# Patient Record
Sex: FEMALE
[De-identification: names, ages, dates, MRNs, and addresses within clinical notes are randomized per-mention and may not be internally consistent; named-entity substitution may affect disease eponyms.]

---

## 2024-04-01 ENCOUNTER — ASOB RESULT (OUTPATIENT)
Age: 39
End: 2024-04-01

## 2024-04-01 ENCOUNTER — APPOINTMENT (OUTPATIENT)
Dept: ANTEPARTUM | Facility: CLINIC | Age: 39
End: 2024-04-01
Payer: COMMERCIAL

## 2024-04-01 PROCEDURE — 76817 TRANSVAGINAL US OBSTETRIC: CPT

## 2024-04-02 PROBLEM — Z00.00 ENCOUNTER FOR PREVENTIVE HEALTH EXAMINATION: Status: ACTIVE | Noted: 2024-04-02

## 2024-04-19 ENCOUNTER — APPOINTMENT (OUTPATIENT)
Dept: ANTEPARTUM | Facility: CLINIC | Age: 39
End: 2024-04-19
Payer: COMMERCIAL

## 2024-04-19 ENCOUNTER — ASOB RESULT (OUTPATIENT)
Age: 39
End: 2024-04-19

## 2024-04-19 PROCEDURE — 36415 COLL VENOUS BLD VENIPUNCTURE: CPT

## 2024-04-19 PROCEDURE — 76813 OB US NUCHAL MEAS 1 GEST: CPT

## 2024-04-19 PROCEDURE — 93976 VASCULAR STUDY: CPT

## 2024-05-14 ENCOUNTER — APPOINTMENT (OUTPATIENT)
Dept: ANTEPARTUM | Facility: CLINIC | Age: 39
End: 2024-05-14
Payer: COMMERCIAL

## 2024-05-14 ENCOUNTER — ASOB RESULT (OUTPATIENT)
Age: 39
End: 2024-05-14

## 2024-05-14 PROCEDURE — 76805 OB US >/= 14 WKS SNGL FETUS: CPT

## 2024-05-14 PROCEDURE — 76817 TRANSVAGINAL US OBSTETRIC: CPT

## 2024-06-10 ENCOUNTER — APPOINTMENT (OUTPATIENT)
Dept: ANTEPARTUM | Facility: CLINIC | Age: 39
End: 2024-06-10
Payer: COMMERCIAL

## 2024-06-10 ENCOUNTER — ASOB RESULT (OUTPATIENT)
Age: 39
End: 2024-06-10

## 2024-06-10 PROCEDURE — 76817 TRANSVAGINAL US OBSTETRIC: CPT

## 2024-06-10 PROCEDURE — 76811 OB US DETAILED SNGL FETUS: CPT

## 2024-06-17 ENCOUNTER — APPOINTMENT (OUTPATIENT)
Dept: ANTEPARTUM | Facility: CLINIC | Age: 39
End: 2024-06-17

## 2024-07-29 ENCOUNTER — APPOINTMENT (OUTPATIENT)
Dept: ANTEPARTUM | Facility: CLINIC | Age: 39
End: 2024-07-29
Payer: COMMERCIAL

## 2024-07-29 ENCOUNTER — ASOB RESULT (OUTPATIENT)
Age: 39
End: 2024-07-29

## 2024-07-29 PROCEDURE — 76820 UMBILICAL ARTERY ECHO: CPT | Mod: 59

## 2024-07-29 PROCEDURE — 76819 FETAL BIOPHYS PROFIL W/O NST: CPT

## 2024-07-29 PROCEDURE — 76816 OB US FOLLOW-UP PER FETUS: CPT

## 2024-08-26 ENCOUNTER — ASOB RESULT (OUTPATIENT)
Age: 39
End: 2024-08-26

## 2024-08-26 ENCOUNTER — APPOINTMENT (OUTPATIENT)
Dept: ANTEPARTUM | Facility: CLINIC | Age: 39
End: 2024-08-26
Payer: COMMERCIAL

## 2024-08-26 PROCEDURE — 76816 OB US FOLLOW-UP PER FETUS: CPT

## 2024-08-26 PROCEDURE — 76819 FETAL BIOPHYS PROFIL W/O NST: CPT | Mod: 59

## 2024-09-12 ENCOUNTER — INPATIENT (INPATIENT)
Facility: HOSPITAL | Age: 39
LOS: 7 days | Discharge: HOME | DRG: 833 | End: 2024-09-20
Attending: OBSTETRICS & GYNECOLOGY | Admitting: OBSTETRICS & GYNECOLOGY
Payer: COMMERCIAL

## 2024-09-12 VITALS
HEART RATE: 107 BPM | RESPIRATION RATE: 6 BRPM | TEMPERATURE: 98 F | DIASTOLIC BLOOD PRESSURE: 76 MMHG | SYSTOLIC BLOOD PRESSURE: 111 MMHG

## 2024-09-12 DIAGNOSIS — O26.899 OTHER SPECIFIED PREGNANCY RELATED CONDITIONS, UNSPECIFIED TRIMESTER: ICD-10-CM

## 2024-09-12 LAB
ALBUMIN SERPL ELPH-MCNC: 3.9 G/DL — SIGNIFICANT CHANGE UP (ref 3.3–5)
ALP SERPL-CCNC: 162 U/L — HIGH (ref 40–120)
ALT FLD-CCNC: 21 U/L — SIGNIFICANT CHANGE UP (ref 10–45)
ANION GAP SERPL CALC-SCNC: 9 MMOL/L — SIGNIFICANT CHANGE UP (ref 5–17)
APTT BLD: 26 SEC — SIGNIFICANT CHANGE UP (ref 24.5–35.6)
AST SERPL-CCNC: 25 U/L — SIGNIFICANT CHANGE UP (ref 10–40)
BASOPHILS # BLD AUTO: 0.02 K/UL — SIGNIFICANT CHANGE UP (ref 0–0.2)
BASOPHILS NFR BLD AUTO: 0.1 % — SIGNIFICANT CHANGE UP (ref 0–2)
BILIRUB SERPL-MCNC: 0.3 MG/DL — SIGNIFICANT CHANGE UP (ref 0.2–1.2)
BLD GP AB SCN SERPL QL: NEGATIVE — SIGNIFICANT CHANGE UP
BUN SERPL-MCNC: 11 MG/DL — SIGNIFICANT CHANGE UP (ref 7–23)
CALCIUM SERPL-MCNC: 8.9 MG/DL — SIGNIFICANT CHANGE UP (ref 8.4–10.5)
CHLORIDE SERPL-SCNC: 103 MMOL/L — SIGNIFICANT CHANGE UP (ref 96–108)
CO2 SERPL-SCNC: 21 MMOL/L — LOW (ref 22–31)
CREAT SERPL-MCNC: 0.43 MG/DL — LOW (ref 0.5–1.3)
EGFR: 126 ML/MIN/1.73M2 — SIGNIFICANT CHANGE UP
EOSINOPHIL # BLD AUTO: 0.01 K/UL — SIGNIFICANT CHANGE UP (ref 0–0.5)
EOSINOPHIL NFR BLD AUTO: 0.1 % — SIGNIFICANT CHANGE UP (ref 0–6)
FIBRINOGEN PPP-MCNC: 445 MG/DL — SIGNIFICANT CHANGE UP (ref 200–445)
GLUCOSE SERPL-MCNC: 118 MG/DL — HIGH (ref 70–99)
HCT VFR BLD CALC: 30.7 % — LOW (ref 34.5–45)
HGB BLD-MCNC: 9.8 G/DL — LOW (ref 11.5–15.5)
IMM GRANULOCYTES NFR BLD AUTO: 0.9 % — SIGNIFICANT CHANGE UP (ref 0–0.9)
LDH SERPL L TO P-CCNC: 173 U/L — SIGNIFICANT CHANGE UP (ref 50–242)
LYMPHOCYTES # BLD AUTO: 1.06 K/UL — SIGNIFICANT CHANGE UP (ref 1–3.3)
LYMPHOCYTES # BLD AUTO: 7.6 % — LOW (ref 13–44)
MCHC RBC-ENTMCNC: 28.2 PG — SIGNIFICANT CHANGE UP (ref 27–34)
MCHC RBC-ENTMCNC: 31.9 GM/DL — LOW (ref 32–36)
MCV RBC AUTO: 88.2 FL — SIGNIFICANT CHANGE UP (ref 80–100)
MONOCYTES # BLD AUTO: 0.24 K/UL — SIGNIFICANT CHANGE UP (ref 0–0.9)
MONOCYTES NFR BLD AUTO: 1.7 % — LOW (ref 2–14)
NEUTROPHILS # BLD AUTO: 12.49 K/UL — HIGH (ref 1.8–7.4)
NEUTROPHILS NFR BLD AUTO: 89.6 % — HIGH (ref 43–77)
NRBC # BLD: 0 /100 WBCS — SIGNIFICANT CHANGE UP (ref 0–0)
PLATELET # BLD AUTO: 293 K/UL — SIGNIFICANT CHANGE UP (ref 150–400)
POTASSIUM SERPL-MCNC: 3.9 MMOL/L — SIGNIFICANT CHANGE UP (ref 3.5–5.3)
POTASSIUM SERPL-SCNC: 3.9 MMOL/L — SIGNIFICANT CHANGE UP (ref 3.5–5.3)
PROT SERPL-MCNC: 7.5 G/DL — SIGNIFICANT CHANGE UP (ref 6–8.3)
RBC # BLD: 3.48 M/UL — LOW (ref 3.8–5.2)
RBC # FLD: 13.8 % — SIGNIFICANT CHANGE UP (ref 10.3–14.5)
RH IG SCN BLD-IMP: POSITIVE — SIGNIFICANT CHANGE UP
RH IG SCN BLD-IMP: POSITIVE — SIGNIFICANT CHANGE UP
SODIUM SERPL-SCNC: 133 MMOL/L — LOW (ref 135–145)
URATE SERPL-MCNC: 3.4 MG/DL — SIGNIFICANT CHANGE UP (ref 2.5–7)
WBC # BLD: 13.95 K/UL — HIGH (ref 3.8–10.5)
WBC # FLD AUTO: 13.95 K/UL — HIGH (ref 3.8–10.5)

## 2024-09-12 PROCEDURE — 88307 TISSUE EXAM BY PATHOLOGIST: CPT | Mod: 26

## 2024-09-12 RX ORDER — AMOXICILLIN 500 MG
500 CAPSULE ORAL EVERY 8 HOURS
Refills: 0 | Status: DISCONTINUED | OUTPATIENT
Start: 2024-09-12 | End: 2024-09-13

## 2024-09-12 RX ORDER — AZITHROMYCIN 500 MG/1
1000 TABLET, FILM COATED ORAL ONCE
Refills: 0 | Status: COMPLETED | OUTPATIENT
Start: 2024-09-12 | End: 2024-09-12

## 2024-09-12 RX ORDER — BETAMETHASONE VALERATE
12 POWDER (GRAM) MISCELLANEOUS EVERY 24 HOURS
Refills: 0 | Status: COMPLETED | OUTPATIENT
Start: 2024-09-12 | End: 2024-09-13

## 2024-09-12 RX ORDER — FLU VACCINE TS 2012-2013(5YR+) 45MCG/.5ML
0.5 VIAL (ML) INTRAMUSCULAR ONCE
Refills: 0 | Status: DISCONTINUED | OUTPATIENT
Start: 2024-09-12 | End: 2024-09-16

## 2024-09-12 RX ORDER — AMPICILLIN TRIHYDRATE 500 MG
CAPSULE ORAL
Refills: 0 | Status: DISCONTINUED | OUTPATIENT
Start: 2024-09-12 | End: 2024-09-13

## 2024-09-12 RX ORDER — AMPICILLIN TRIHYDRATE 500 MG
2 CAPSULE ORAL ONCE
Refills: 0 | Status: COMPLETED | OUTPATIENT
Start: 2024-09-12 | End: 2024-09-12

## 2024-09-12 RX ORDER — AMPICILLIN TRIHYDRATE 500 MG
2 CAPSULE ORAL EVERY 6 HOURS
Refills: 0 | Status: DISCONTINUED | OUTPATIENT
Start: 2024-09-12 | End: 2024-09-13

## 2024-09-12 RX ADMIN — Medication 116 GRAM(S): at 22:13

## 2024-09-12 RX ADMIN — Medication 116 GRAM(S): at 16:08

## 2024-09-12 RX ADMIN — AZITHROMYCIN 1000 MILLIGRAM(S): 500 TABLET, FILM COATED ORAL at 17:01

## 2024-09-12 RX ADMIN — Medication 12 MILLIGRAM(S): at 13:18

## 2024-09-12 NOTE — OB RN PATIENT PROFILE - CENTRAL VENOUS CATHETER
Bed: 10  Expected date:   Expected time:   Means of arrival: [facility] [facility] [employer]  Comments:  JUS AQUINO    no

## 2024-09-12 NOTE — OB RN TRIAGE NOTE - AS PAIN REST
0 (no pain/absence of nonverbal indicators of pain)
Plan: Advised removal is cosmetic, $150/seesion
Detail Level: Zone

## 2024-09-12 NOTE — OB PROVIDER H&P - HISTORY OF PRESENT ILLNESS
Patient is   39y GP at 33w6d presenting for vaginal bleeding. Patient states she had brown discharge for 1 week, was evaluated in office when the bleeding began, was found to be closed and told to monitor. In the last 2 days she began having increased discharge with red streaking. States she does not have heavy bleeding or discharge, she uses pantyliners. Denies anything in vaginal canal, denies sexual intercourse. Denies LOF, trickling, CTX. Endorses FM.     Ante: Spontaneous pregnancy. NIPT wnl. Anatomy scan significant for echogenic bowel. Was also found to have fetal PACs s/p normal fetal echo.   EFW   GBS unknown    OBHX:  G1 - current   GynHX: denies fibroids, ovarian cysts, abnormal pap smear, STI/herpes. history of 3 cm intramural fibroid at the fundus. HPV+ pap nl in 2024.  MedHX: denies  Surghx: denies  Medications: denies  Allergies: NKDA    Physical Exam:  /76       General: NAD  Pulm: no increased WOB  Abdomen: soft, gravid, nontender  Extremities: wnl     TAUS: Breech spine anterior BENJI 12.14  VE: 1/long   Spec: gross pooling on examination, minimal blood appreciated, nitrazine x2 positive + on microscope.     EFM: 150 bpm, mod variability, + accels, - decels; reactive and reassuring  [place]: q9min            Patient is   39y  at 33w6d presenting for vaginal bleeding. Patient states she had brown discharge for 1 week, was evaluated in office when the bleeding began, was found to be closed and told to monitor. In the last 2 days she began having increased discharge with red streaking. States she does not have heavy bleeding or discharge, she uses pantyliners. Denies anything in vaginal canal, denies sexual intercourse. Denies LOF, trickling, CTX. Endorses FM.     Ante: Spontaneous pregnancy. NIPT wnl. Anatomy scan significant for echogenic bowel. Was also found to have fetal PACs s/p normal fetal echo.   EFW   GBS unknown    OBHX:  G1 - current   GynHX: denies fibroids, ovarian cysts, abnormal pap smear, STI/herpes. history of 3 cm intramural fibroid at the fundus. HPV+ pap nl in .  MedHX: denies  Surghx: denies  Medications: denies  Allergies: NKDA    Physical Exam:  /76       General: NAD  Pulm: no increased WOB  Abdomen: soft, gravid, nontender  Extremities: wnl     TAUS: Breech spine anterior BENJI 12.14  VE: 1/long   Spec: gross pooling on examination, minimal blood appreciated, nitrazine x2 positive + on microscope.     EFM: 150 bpm, mod variability, + accels, - decels; reactive and reassuring  [place]: q9min            Patient is   39y  at 33w6d presenting for vaginal bleeding. Patient states she had brown discharge for 1 week, was evaluated in office when the bleeding began, was found to be closed and told to monitor. In the last 2 days she began having increased discharge with red streaking. States she does not have heavy bleeding or discharge, she uses pantyliners. Denies anything in vaginal canal, denies sexual intercourse. Denies LOF, trickling, CTX. Endorses FM.     Ante: Spontaneous pregnancy. NIPT wnl. Anatomy scan significant for echogenic bowel. Was also found to have fetal PACs s/p normal fetal echo.   GBS unknown    OBHX:  G1 - current   GynHX: +R lateral 3x3cm fibroid. Denies STI/herpes. HPV+ pap nl in .  MedHX: denies  Surghx: denies  Medications: denies  Allergies: NKDA    Physical Exam:  /76       General: NAD  Pulm: no increased WOB  Abdomen: soft, gravid, nontender  Extremities: wnl     TAUS: Breech spine anterior BENJI 12.14  VE: 1/long   Spec: gross pooling on examination, minimal blood appreciated, nitrazine x2 positive + on microscope.     EFM: 150 bpm, mod variability, + accels, - decels; reactive and reassuring  [place]: q9min

## 2024-09-12 NOTE — OB PROVIDER H&P - ASSESSMENT
Patient is a 40 y/o  at 33w6d admitted for PPROM requiring latency abx and steroids.    PLAN  - Admit to antepartum   - Given <34w, pt will receive steroids 12 mg q24h x 2 and latency abx azithromycin PO 1gx1 and ampicillin 2g IV q6h x 48h  - Will plan for CS in setting of breech presentation after steroids x2.   - Consent signed for  section   - NPO  - IV fluids and labs ordered  - GBS unk - GBS swab collected today   - Continuous EFM       Cassy Ha, PGY 1  Discussed with Dr. Nicholson, PGY3 and Dr. Virgen, attending    Patient is a 40 y/o  at 33w6d admitted for PPROM requiring latency abx and steroids.    PLAN  - Admit to antepartum   - Given <34w, pt will receive steroids 12 mg q24h x 2 and latency abx azithromycin PO 1gx1 and ampicillin 2g IV q6h x 48h  - Will plan for CS in setting of breech presentation after steroids x2 or in case of any indication for delivery (labor, ctx, fever)  - Consent signed for  section   - NPO  - IV fluids and labs ordered  - GBS unk - GBS swab collected today   - Continuous EFM       Cassy Ha, PGY 1  Discussed with Dr. Nicholson, PGY3 and Dr. Virgen, attending

## 2024-09-12 NOTE — OB RN PATIENT PROFILE - FALL HARM RISK - UNIVERSAL INTERVENTIONS
Bed in lowest position, wheels locked, appropriate side rails in place/Call bell, personal items and telephone in reach/Instruct patient to call for assistance before getting out of bed or chair/Non-slip footwear when patient is out of bed/[place] to call system/Physically safe environment - no spills, clutter or unnecessary equipment/Purposeful Proactive Rounding/Room/bathroom lighting operational, light cord in reach

## 2024-09-12 NOTE — OB PROVIDER H&P - NSLOWPPHRISK_OBGYN_A_OB
No previous uterine incision/[place] Pregnancy/Less than or equal to 4 previous vaginal births/No known bleeding disorder/No history of postpartum hemorrhage

## 2024-09-13 PROCEDURE — 99222 1ST HOSP IP/OBS MODERATE 55: CPT

## 2024-09-13 RX ADMIN — Medication 125 MILLILITER(S): at 09:48

## 2024-09-13 RX ADMIN — Medication 116 GRAM(S): at 10:04

## 2024-09-13 RX ADMIN — Medication 116 GRAM(S): at 04:00

## 2024-09-13 RX ADMIN — Medication 12 MILLIGRAM(S): at 13:27

## 2024-09-13 NOTE — CHART NOTE - NSCHARTNOTEFT_GEN_A_CORE
NST reviewed. Baseline 135, moderate variability, +accels, no decels  TOCO: uterine irritability, no contractions  reactive and reassuring    - continue NST TID

## 2024-09-13 NOTE — CONSULT NOTE ADULT - SUBJECTIVE AND OBJECTIVE BOX
Maternal Fetal Medicine Consult     Ms. Wen is a 38 yo  at 34 0/7wga admitted to the antepartum service with PROM. She currently denies vaginal bleeding or cramping/contractions. She reports normal fetal movement. She denies fever.     Ante: Spontaneous pregnancy. NIPT wnl. Fetal PACs s/p normal fetal echo.     PMH: denies  PSH: denies  Medications: denies  NKDA    Repeat SVE deferred, grossly ruptured per my colleagues     EFM category 1    Recommendations:  The maternal and fetal status are currently reassuring. Agree with plan of care for BMZ. NICU consult pending. GBS pending. There is no current urgent/emergent indication for delivery. There is no clinical suspicion for chorioamnionitis. We discussed the risks, benefits, alternatives, and indications for delivery versus expectant management. We discussed the guidelines, as summarized:     ACOG PB #217   " Abnormal results from fetal testing, clinical intraamniotic infection, and significant abruptio placentae are clear indications for delivery. Otherwise, gestational age is a primary factor when considering delivery versus expectant management. However, the optimal gestational age for delivery is unclear and controversial. A meta-analysis of 12 randomized controlled trials, including 3,617 women, concluded there was evidence to guide clinical practice toward expectant management regarding the risks and benefits of expectant management versus delivery in the setting of  PROM. Although there was no difference in  sepsis between women who gave birth immediately compared with those managed expectantly, immediate birth had higher risks for  respiratory distress, need for ventilation,  mortality,  intensive care unit admission, and likelihood of  birth. In patients with no contraindications to continuing the pregnancy, such as abnormal results from fetal testing or intrauterine infection, expectant management likely provides benefit for the woman and . Patients with  PROM before 34 0/7 weeks of gestation should be managed expectantly if no maternal or fetal contraindications exist. At 34 0/7 weeks of gestation and before 37 0/7 weeks of gestation, delivery has traditionally been recommended for all women with ruptured membranes. However, a recent large randomized trial of 1,839 women that evaluated immediate delivery (shortly after diagnosis and preferably within 24 hours) versus expectant management in patients with PROM between 34 0/7 weeks of gestation and 36 6/7 weeks of gestation suggests benefits to expectant management. Expectant management was according to local practice at participating centers, with 73% of patients managed in a hospital setting. There was no significant difference in the primary outcome— sepsis—or in the secondary outcome of composite  morbidity. Infants in the immediate delivery group had higher rates of respiratory distress (relative risk [RR], 1.6; 95% CI, 1.1–2.3) and mechanical ventilation (RR, 1.4; 95% CI, 1.0–1.8) and spent more days in intensive care (4 days versus 2 days). However, maternal adverse outcomes, such as hemorrhage and infection, were approximately twofold higher with expectant management, although the rate of  birth was lower (RR, 1.4; 95% CI, 1.2–1.7). According to the authors, the findings suggest that if expectant management is chosen, it should include careful monitoring of symptoms and signs of maternal infection, chorioamnionitis, and antepartum hemorrhage. This monitoring may be done best in a hospital setting. An individual participant data meta-analysis of three trials showed similar results, with no difference in composite adverse  outcome or  sepsis when comparing expectant management with immediate delivery. In addition, immediate delivery resulted in higher rates of respiratory distress syndrome, intensive care admission, and  birth. Either expectant management or immediate delivery in patients with PROM between 34 0/7 weeks of gestation and 36 6/7 weeks of gestation is a reasonable option, although the balance between benefit and risk, from both maternal and  perspectives, should be carefully considered, and patients should be counseled clearly. Care should be individualized through shared decision making, and expectant management should not extend beyond 37 0/7 weeks of gestation. Latency antibiotics are not appropriate in this setting."

## 2024-09-14 LAB
BASOPHILS # BLD AUTO: 0.01 K/UL — SIGNIFICANT CHANGE UP (ref 0–0.2)
BASOPHILS NFR BLD AUTO: 0.1 % — SIGNIFICANT CHANGE UP (ref 0–2)
CULTURE RESULTS: SIGNIFICANT CHANGE UP
EOSINOPHIL # BLD AUTO: 0 K/UL — SIGNIFICANT CHANGE UP (ref 0–0.5)
EOSINOPHIL NFR BLD AUTO: 0 % — SIGNIFICANT CHANGE UP (ref 0–6)
HCT VFR BLD CALC: 26.8 % — LOW (ref 34.5–45)
HGB BLD-MCNC: 8.3 G/DL — LOW (ref 11.5–15.5)
IMM GRANULOCYTES NFR BLD AUTO: 1.3 % — HIGH (ref 0–0.9)
LYMPHOCYTES # BLD AUTO: 1.46 K/UL — SIGNIFICANT CHANGE UP (ref 1–3.3)
LYMPHOCYTES # BLD AUTO: 12.4 % — LOW (ref 13–44)
MCHC RBC-ENTMCNC: 27.1 PG — SIGNIFICANT CHANGE UP (ref 27–34)
MCHC RBC-ENTMCNC: 31 GM/DL — LOW (ref 32–36)
MCV RBC AUTO: 87.6 FL — SIGNIFICANT CHANGE UP (ref 80–100)
MONOCYTES # BLD AUTO: 0.75 K/UL — SIGNIFICANT CHANGE UP (ref 0–0.9)
MONOCYTES NFR BLD AUTO: 6.4 % — SIGNIFICANT CHANGE UP (ref 2–14)
NEUTROPHILS # BLD AUTO: 9.36 K/UL — HIGH (ref 1.8–7.4)
NEUTROPHILS NFR BLD AUTO: 79.8 % — HIGH (ref 43–77)
NRBC # BLD: 0 /100 WBCS — SIGNIFICANT CHANGE UP (ref 0–0)
PLATELET # BLD AUTO: 290 K/UL — SIGNIFICANT CHANGE UP (ref 150–400)
RBC # BLD: 3.06 M/UL — LOW (ref 3.8–5.2)
RBC # FLD: 13.8 % — SIGNIFICANT CHANGE UP (ref 10.3–14.5)
SPECIMEN SOURCE: SIGNIFICANT CHANGE UP
WBC # BLD: 11.73 K/UL — HIGH (ref 3.8–10.5)
WBC # FLD AUTO: 11.73 K/UL — HIGH (ref 3.8–10.5)

## 2024-09-14 RX ORDER — SODIUM FERRIC GLUCONATE COMPLEX 12.5 MG/ML
125 INJECTION INTRAVENOUS
Refills: 0 | Status: COMPLETED | OUTPATIENT
Start: 2024-09-14 | End: 2024-09-15

## 2024-09-14 RX ORDER — VITAMIN A, ASCORBIC ACID, VITAMIN D, .ALPHA.-TOCOPHEROL, THIAMINE MONONITRATE, RIBOFLAVIN, NIACIN, PYRIDOXINE HYDROCHLORIDE, FOLIC ACID, CYANOCOBALAMIN, CALCIUM, IRON, MAGNESIUM, ZINC, AND COPPER 2700; 70; 400; 30; 1.6; 1.8; 18; 2.5; 1; 12; 100; 65; 25; 25; 2 [IU]/1; MG/1; [IU]/1; [IU]/1; MG/1; MG/1; MG/1; MG/1; MG/1; UG/1; MG/1; MG/1; MG/1; MG/1; MG/1
1 TABLET ORAL DAILY
Refills: 0 | Status: DISCONTINUED | OUTPATIENT
Start: 2024-09-14 | End: 2024-09-16

## 2024-09-14 RX ADMIN — SODIUM FERRIC GLUCONATE COMPLEX 110 MILLIGRAM(S): 12.5 INJECTION INTRAVENOUS at 17:10

## 2024-09-14 RX ADMIN — SODIUM FERRIC GLUCONATE COMPLEX 110 MILLIGRAM(S): 12.5 INJECTION INTRAVENOUS at 13:30

## 2024-09-14 RX ADMIN — VITAMIN A, ASCORBIC ACID, VITAMIN D, .ALPHA.-TOCOPHEROL, THIAMINE MONONITRATE, RIBOFLAVIN, NIACIN, PYRIDOXINE HYDROCHLORIDE, FOLIC ACID, CYANOCOBALAMIN, CALCIUM, IRON, MAGNESIUM, ZINC, AND COPPER 1 TABLET(S): 2700; 70; 400; 30; 1.6; 1.8; 18; 2.5; 1; 12; 100; 65; 25; 25; 2 TABLET ORAL at 12:52

## 2024-09-14 NOTE — CHART NOTE - NSCHARTNOTEFT_GEN_A_CORE
BPP performed for reports of DFM overnight.  Pt reports fluid is continuing to leak however at less volume.    BPP 6/8, BENJI 3 cm. Patient is now feeling improved fetal movement, states baby is moving more like normal in quality and quantity today. Educated pt that BPP of 6/8 is due to oligohydramnios of 3 cm, likely in setting of PPROM. Patient endorses understanding. Fetus is still breech at this time, spine to maternal anterior left.    Cassy Ha PGY1  d/w Lyn PGY3

## 2024-09-15 ENCOUNTER — TRANSCRIPTION ENCOUNTER (OUTPATIENT)
Age: 39
End: 2024-09-15

## 2024-09-15 LAB
BASOPHILS # BLD AUTO: 0 K/UL — SIGNIFICANT CHANGE UP (ref 0–0.2)
BASOPHILS NFR BLD AUTO: 0 % — SIGNIFICANT CHANGE UP (ref 0–2)
BLD GP AB SCN SERPL QL: NEGATIVE — SIGNIFICANT CHANGE UP
EOSINOPHIL # BLD AUTO: 0 K/UL — SIGNIFICANT CHANGE UP (ref 0–0.5)
EOSINOPHIL NFR BLD AUTO: 0 % — SIGNIFICANT CHANGE UP (ref 0–6)
FERRITIN SERPL-MCNC: 48 NG/ML — SIGNIFICANT CHANGE UP (ref 15–150)
HCT VFR BLD CALC: 27.9 % — LOW (ref 34.5–45)
HGB BLD-MCNC: 8.6 G/DL — LOW (ref 11.5–15.5)
IRON SATN MFR SERPL: 539 UG/DL — HIGH (ref 30–160)
IRON SATN MFR SERPL: SIGNIFICANT CHANGE UP % (ref 14–50)
LYMPHOCYTES # BLD AUTO: 1.36 K/UL — SIGNIFICANT CHANGE UP (ref 1–3.3)
LYMPHOCYTES # BLD AUTO: 11.7 % — LOW (ref 13–44)
MCHC RBC-ENTMCNC: 27.1 PG — SIGNIFICANT CHANGE UP (ref 27–34)
MCHC RBC-ENTMCNC: 30.8 GM/DL — LOW (ref 32–36)
MCV RBC AUTO: 88 FL — SIGNIFICANT CHANGE UP (ref 80–100)
MONOCYTES # BLD AUTO: 1.67 K/UL — HIGH (ref 0–0.9)
MONOCYTES NFR BLD AUTO: 14.4 % — HIGH (ref 2–14)
NEUTROPHILS # BLD AUTO: 8.57 K/UL — HIGH (ref 1.8–7.4)
NEUTROPHILS NFR BLD AUTO: 73 % — SIGNIFICANT CHANGE UP (ref 43–77)
NRBC # BLD: SIGNIFICANT CHANGE UP /100 WBCS (ref 0–0)
PLATELET # BLD AUTO: 329 K/UL — SIGNIFICANT CHANGE UP (ref 150–400)
RBC # BLD: 3.17 M/UL — LOW (ref 3.8–5.2)
RBC # FLD: 13.7 % — SIGNIFICANT CHANGE UP (ref 10.3–14.5)
RH IG SCN BLD-IMP: POSITIVE — SIGNIFICANT CHANGE UP
TIBC SERPL-MCNC: SIGNIFICANT CHANGE UP UG/DL (ref 220–430)
TRANSFERRIN SERPL-MCNC: 464 MG/DL — HIGH (ref 200–360)
UIBC SERPL-MCNC: <16.8 UG/DL — SIGNIFICANT CHANGE UP (ref 110–370)
WBC # BLD: 11.6 K/UL — HIGH (ref 3.8–10.5)
WBC # FLD AUTO: 11.6 K/UL — HIGH (ref 3.8–10.5)

## 2024-09-15 RX ADMIN — SODIUM FERRIC GLUCONATE COMPLEX 110 MILLIGRAM(S): 12.5 INJECTION INTRAVENOUS at 12:07

## 2024-09-15 RX ADMIN — VITAMIN A, ASCORBIC ACID, VITAMIN D, .ALPHA.-TOCOPHEROL, THIAMINE MONONITRATE, RIBOFLAVIN, NIACIN, PYRIDOXINE HYDROCHLORIDE, FOLIC ACID, CYANOCOBALAMIN, CALCIUM, IRON, MAGNESIUM, ZINC, AND COPPER 1 TABLET(S): 2700; 70; 400; 30; 1.6; 1.8; 18; 2.5; 1; 12; 100; 65; 25; 25; 2 TABLET ORAL at 12:07

## 2024-09-15 NOTE — CHART NOTE - NSCHARTNOTEFT_GEN_A_CORE
Note delayed due to patient care    EFM reviewed  baseline 125/mod lorne/+accel/no decel    Overall reassuring tracing, no decels  Plan to continue NST TID    Lyn PGY3

## 2024-09-16 LAB
BASOPHILS # BLD AUTO: 0.02 K/UL — SIGNIFICANT CHANGE UP (ref 0–0.2)
BASOPHILS NFR BLD AUTO: 0.2 % — SIGNIFICANT CHANGE UP (ref 0–2)
EOSINOPHIL # BLD AUTO: 0.03 K/UL — SIGNIFICANT CHANGE UP (ref 0–0.5)
EOSINOPHIL NFR BLD AUTO: 0.3 % — SIGNIFICANT CHANGE UP (ref 0–6)
HCT VFR BLD CALC: 28 % — LOW (ref 34.5–45)
HGB BLD-MCNC: 8.5 G/DL — LOW (ref 11.5–15.5)
IMM GRANULOCYTES NFR BLD AUTO: 4.3 % — HIGH (ref 0–0.9)
LYMPHOCYTES # BLD AUTO: 18.9 % — SIGNIFICANT CHANGE UP (ref 13–44)
LYMPHOCYTES # BLD AUTO: 2.1 K/UL — SIGNIFICANT CHANGE UP (ref 1–3.3)
MCHC RBC-ENTMCNC: 28.1 PG — SIGNIFICANT CHANGE UP (ref 27–34)
MCHC RBC-ENTMCNC: 30.4 GM/DL — LOW (ref 32–36)
MCV RBC AUTO: 92.7 FL — SIGNIFICANT CHANGE UP (ref 80–100)
MONOCYTES # BLD AUTO: 1.13 K/UL — HIGH (ref 0–0.9)
MONOCYTES NFR BLD AUTO: 10.1 % — SIGNIFICANT CHANGE UP (ref 2–14)
NEUTROPHILS # BLD AUTO: 7.38 K/UL — SIGNIFICANT CHANGE UP (ref 1.8–7.4)
NEUTROPHILS NFR BLD AUTO: 66.2 % — SIGNIFICANT CHANGE UP (ref 43–77)
NRBC # BLD: 0 /100 WBCS — SIGNIFICANT CHANGE UP (ref 0–0)
PLATELET # BLD AUTO: 289 K/UL — SIGNIFICANT CHANGE UP (ref 150–400)
RBC # BLD: 3.02 M/UL — LOW (ref 3.8–5.2)
RBC # FLD: 14 % — SIGNIFICANT CHANGE UP (ref 10.3–14.5)
WBC # BLD: 11.14 K/UL — HIGH (ref 3.8–10.5)
WBC # FLD AUTO: 11.14 K/UL — HIGH (ref 3.8–10.5)

## 2024-09-16 RX ORDER — DEXAMETHASONE 0.75 MG
4 TABLET ORAL EVERY 6 HOURS
Refills: 0 | Status: DISCONTINUED | OUTPATIENT
Start: 2024-09-16 | End: 2024-09-16

## 2024-09-16 RX ORDER — SODIUM CITRATE AND CITRIC ACID MONOHYDRATE 334; 500 MG/5ML; MG/5ML
30 SOLUTION ORAL ONCE
Refills: 0 | Status: COMPLETED | OUTPATIENT
Start: 2024-09-16 | End: 2024-09-16

## 2024-09-16 RX ORDER — ONDANSETRON 2 MG/ML
4 INJECTION, SOLUTION INTRAMUSCULAR; INTRAVENOUS EVERY 6 HOURS
Refills: 0 | Status: DISCONTINUED | OUTPATIENT
Start: 2024-09-16 | End: 2024-09-16

## 2024-09-16 RX ORDER — DIPHENHYDRAMINE HCL 50 MG
25 CAPSULE ORAL EVERY 6 HOURS
Refills: 0 | Status: DISCONTINUED | OUTPATIENT
Start: 2024-09-16 | End: 2024-09-20

## 2024-09-16 RX ORDER — LANOLIN
1 OINTMENT (GRAM) TOPICAL EVERY 6 HOURS
Refills: 0 | Status: DISCONTINUED | OUTPATIENT
Start: 2024-09-16 | End: 2024-09-20

## 2024-09-16 RX ORDER — NALOXONE HCL 1 MG/ML
0.1 VIAL (ML) INJECTION
Refills: 0 | Status: DISCONTINUED | OUTPATIENT
Start: 2024-09-16 | End: 2024-09-16

## 2024-09-16 RX ORDER — ENOXAPARIN SODIUM 100 MG/ML
40 INJECTION SUBCUTANEOUS EVERY 24 HOURS
Refills: 0 | Status: DISCONTINUED | OUTPATIENT
Start: 2024-09-17 | End: 2024-09-20

## 2024-09-16 RX ORDER — OXYCODONE HYDROCHLORIDE 5 MG/1
5 TABLET ORAL
Refills: 0 | Status: DISCONTINUED | OUTPATIENT
Start: 2024-09-16 | End: 2024-09-20

## 2024-09-16 RX ORDER — ONDANSETRON 2 MG/ML
8 INJECTION, SOLUTION INTRAMUSCULAR; INTRAVENOUS EVERY 6 HOURS
Refills: 0 | Status: DISCONTINUED | OUTPATIENT
Start: 2024-09-16 | End: 2024-09-20

## 2024-09-16 RX ORDER — AZITHROMYCIN 500 MG/1
500 TABLET, FILM COATED ORAL ONCE
Refills: 0 | Status: DISCONTINUED | OUTPATIENT
Start: 2024-09-16 | End: 2024-09-16

## 2024-09-16 RX ORDER — TETANUS TOXOID, REDUCED DIPHTHERIA TOXOID AND ACELLULAR PERTUSSIS VACCINE, ADSORBED 5; 2.5; 8; 8; 2.5 [IU]/.5ML; [IU]/.5ML; UG/.5ML; UG/.5ML; UG/.5ML
0.5 SUSPENSION INTRAMUSCULAR ONCE
Refills: 0 | Status: COMPLETED | OUTPATIENT
Start: 2024-09-16

## 2024-09-16 RX ORDER — IBUPROFEN 600 MG
600 TABLET ORAL EVERY 6 HOURS
Refills: 0 | Status: COMPLETED | OUTPATIENT
Start: 2024-09-16 | End: 2025-08-15

## 2024-09-16 RX ORDER — CHLORHEXIDINE GLUCONATE 40 MG/ML
1 SOLUTION TOPICAL DAILY
Refills: 0 | Status: DISCONTINUED | OUTPATIENT
Start: 2024-09-16 | End: 2024-09-16

## 2024-09-16 RX ORDER — CEFAZOLIN SODIUM 2 G/100ML
2000 INJECTION, SOLUTION INTRAVENOUS ONCE
Refills: 0 | Status: COMPLETED | OUTPATIENT
Start: 2024-09-16 | End: 2024-09-16

## 2024-09-16 RX ORDER — OXYTOCIN 10 UNIT/ML
167 AMPUL (ML) INJECTION
Qty: 30 | Refills: 0 | Status: DISCONTINUED | OUTPATIENT
Start: 2024-09-16 | End: 2024-09-20

## 2024-09-16 RX ORDER — FAMOTIDINE 10 MG/ML
20 INJECTION INTRAVENOUS ONCE
Refills: 0 | Status: COMPLETED | OUTPATIENT
Start: 2024-09-16 | End: 2024-09-16

## 2024-09-16 RX ORDER — ACETAMINOPHEN 325 MG/1
1000 TABLET ORAL ONCE
Refills: 0 | Status: COMPLETED | OUTPATIENT
Start: 2024-09-16 | End: 2024-09-16

## 2024-09-16 RX ORDER — KETOROLAC TROMETHAMINE 30 MG/ML
30 INJECTION, SOLUTION INTRAMUSCULAR EVERY 6 HOURS
Refills: 0 | Status: DISCONTINUED | OUTPATIENT
Start: 2024-09-16 | End: 2024-09-17

## 2024-09-16 RX ORDER — ACETAMINOPHEN 325 MG/1
975 TABLET ORAL
Refills: 0 | Status: DISCONTINUED | OUTPATIENT
Start: 2024-09-16 | End: 2024-09-20

## 2024-09-16 RX ORDER — OXYCODONE HYDROCHLORIDE 5 MG/1
5 TABLET ORAL ONCE
Refills: 0 | Status: DISCONTINUED | OUTPATIENT
Start: 2024-09-16 | End: 2024-09-20

## 2024-09-16 RX ADMIN — FAMOTIDINE 20 MILLIGRAM(S): 10 INJECTION INTRAVENOUS at 14:27

## 2024-09-16 RX ADMIN — ACETAMINOPHEN 400 MILLIGRAM(S): 325 TABLET ORAL at 16:15

## 2024-09-16 RX ADMIN — KETOROLAC TROMETHAMINE 30 MILLIGRAM(S): 30 INJECTION, SOLUTION INTRAMUSCULAR at 23:46

## 2024-09-16 RX ADMIN — CEFAZOLIN SODIUM 100 MILLIGRAM(S): 2 INJECTION, SOLUTION INTRAVENOUS at 14:36

## 2024-09-16 RX ADMIN — CHLORHEXIDINE GLUCONATE 1 APPLICATION(S): 40 SOLUTION TOPICAL at 14:36

## 2024-09-16 RX ADMIN — SODIUM CITRATE AND CITRIC ACID MONOHYDRATE 30 MILLILITER(S): 334; 500 SOLUTION ORAL at 14:36

## 2024-09-16 RX ADMIN — ACETAMINOPHEN 975 MILLIGRAM(S): 325 TABLET ORAL at 20:51

## 2024-09-16 RX ADMIN — Medication 125 MILLILITER(S): at 09:10

## 2024-09-16 RX ADMIN — KETOROLAC TROMETHAMINE 30 MILLIGRAM(S): 30 INJECTION, SOLUTION INTRAMUSCULAR at 16:59

## 2024-09-16 NOTE — OB RN INTRAOPERATIVE NOTE - NSSELHIDDEN_OBGYN_ALL_OB_FT
[NS_DeliveryAttending1_OBGYN_ALL_OB_FT:[id number]],[NS_DeliveryAssist1_OBGYN_ALL_OB_FT:[id number]],[NS_DeliveryRN_OBGYN_ALL_OB_FT:[id number]=]

## 2024-09-16 NOTE — CHART NOTE - NSCHARTNOTEFT_GEN_A_CORE
Pt seen at bedside. She reports contractions are becoming closer together, formerly feeling them [id number], now q10min and they are more painful. She also feels the bleeding is persisting and worsening. She endorses fetal movements.     FHT: baseline 135, moderate variability, +accels, no decels  TOCO: ctx 2 in 10min  cat I / reactive and reassuring    TAUS: breech presentation confirmed     after d/w patient, patient's partner and OB attending Dr. Jurado decision made to proceed with delivery via primary CS. All questions answered.    - ancef 2g, azithromycin 500mg  - NPO, IVF   - continuous FHT, toco  - proceed with C/S     Andrew Keller PGY2

## 2024-09-16 NOTE — OB PROVIDER DELIVERY SUMMARY - NSPROVIDERDELIVERYNOTE_OBGYN_ALL_OB_FT
Pt 40yo  at 34w4d presents for primary LTCS for breech presentation after p/w PPROM on  s/p BMZ x2.   delivered in breech presentation  Small ~1cm right extension with bandage scissors to facilitate delivery of fetal head   Uterus closed in one layer with 0 chromic  Muscle closed with 0 chromic  Fascia closed with 0 vicryl  Subcutaneous closed with 2-0 plain gut   Skin closed with ensorb  Excellent hemostasis  No complications  , IVF 1L,   Please see dictation

## 2024-09-16 NOTE — OB PROVIDER DELIVERY SUMMARY - NSSELHIDDEN_OBGYN_ALL_OB_FT
[NS_DeliveryAttending1_OBGYN_ALL_OB_FT:[id number]],[NS_DeliveryAssist1_OBGYN_ALL_OB_FT:[id number]=],[NS_DeliveryRN_OBGYN_ALL_OB_FT:[id number]=]

## 2024-09-16 NOTE — OB NEONATOLOGY/PEDIATRICIAN DELIVERY SUMMARY - NSPEDSNEONOTESA_OBGYN_ALL_OB_FT
34 weeks gestation male born via C/S due to breech presentation and  labor.  Born with weak cry and decreased tone so no delayed cord clamping done.  Baby handed to  Team, warmed, dried and stimulated.  Oropharynx suctioned.  Baby with vigorous cry.  Began having grunting and retractions, CPAP +5 given up to 70%, weaned down to 40% by 10 minutes of life.  Transferred to NICU.

## 2024-09-16 NOTE — OB RN INTRAOPERATIVE NOTE - NS_SCRUBTECH_OBGYN_ALL_OB_FT
Wound on R leg dressed with abx ointment and secured with gauze, kerlix, and ace wrap. Pt tolerated well.      Pauline Garcia  09/18/17 1907     Timoteo Timoteo Mayo

## 2024-09-16 NOTE — OB RN DELIVERY SUMMARY - NS_SEPSISRSKCALC_OBGYN_ALL_OB_FT
EOS calculated successfully. EOS Risk Factor: 0.5/1000 live births ([facility] national incidence); GA=34w3d; Temp=98.8; ROM=0.033; GBS='Unknown'; Antibiotics='No antibiotics or any antibiotics < 2 hrs prior to birth'   EOS calculated successfully. EOS Risk Factor: 0.5/1000 live births ([facility] national incidence); GA=34w3d; Temp=98.8; QGH=882.433; GBS='Unknown'; Antibiotics='No antibiotics or any antibiotics < 2 hrs prior to birth'

## 2024-09-16 NOTE — OB RN DELIVERY SUMMARY - NSSELHIDDEN_OBGYN_ALL_OB_FT
[NS_DeliveryAttending1_OBGYN_ALL_OB_FT:[id number]==],[NS_DeliveryAssist1_OBGYN_ALL_OB_FT:[id number]],[NS_DeliveryRN_OBGYN_ALL_OB_FT:[id number]=]

## 2024-09-17 LAB
ANISOCYTOSIS BLD QL: SLIGHT — SIGNIFICANT CHANGE UP
BASOPHILS # BLD AUTO: 0 K/UL — SIGNIFICANT CHANGE UP (ref 0–0.2)
BASOPHILS NFR BLD AUTO: 0 % — SIGNIFICANT CHANGE UP (ref 0–2)
EOSINOPHIL # BLD AUTO: 0 K/UL — SIGNIFICANT CHANGE UP (ref 0–0.5)
EOSINOPHIL NFR BLD AUTO: 0 % — SIGNIFICANT CHANGE UP (ref 0–6)
GIANT PLATELETS BLD QL SMEAR: PRESENT — SIGNIFICANT CHANGE UP
HCT VFR BLD CALC: 26.4 % — LOW (ref 34.5–45)
HGB BLD-MCNC: 8 G/DL — LOW (ref 11.5–15.5)
HYPOCHROMIA BLD QL: SLIGHT — SIGNIFICANT CHANGE UP
LYMPHOCYTES # BLD AUTO: 13 % — SIGNIFICANT CHANGE UP (ref 13–44)
LYMPHOCYTES # BLD AUTO: 2.61 K/UL — SIGNIFICANT CHANGE UP (ref 1–3.3)
MANUAL SMEAR VERIFICATION: SIGNIFICANT CHANGE UP
MCHC RBC-ENTMCNC: 26.8 PG — LOW (ref 27–34)
MCHC RBC-ENTMCNC: 30.3 GM/DL — LOW (ref 32–36)
MCV RBC AUTO: 88.3 FL — SIGNIFICANT CHANGE UP (ref 80–100)
MONOCYTES # BLD AUTO: 0.52 K/UL — SIGNIFICANT CHANGE UP (ref 0–0.9)
MONOCYTES NFR BLD AUTO: 2.6 % — SIGNIFICANT CHANGE UP (ref 2–14)
NEUTROPHILS # BLD AUTO: 16.97 K/UL — HIGH (ref 1.8–7.4)
NEUTROPHILS NFR BLD AUTO: 84.4 % — HIGH (ref 43–77)
OVALOCYTES BLD QL SMEAR: SLIGHT — SIGNIFICANT CHANGE UP
PLAT MORPH BLD: ABNORMAL
PLATELET # BLD AUTO: 296 K/UL — SIGNIFICANT CHANGE UP (ref 150–400)
POLYCHROMASIA BLD QL SMEAR: SLIGHT — SIGNIFICANT CHANGE UP
RBC # BLD: 2.99 M/UL — LOW (ref 3.8–5.2)
RBC # FLD: 13.9 % — SIGNIFICANT CHANGE UP (ref 10.3–14.5)
RBC BLD AUTO: ABNORMAL
WBC # BLD: 20.11 K/UL — HIGH (ref 3.8–10.5)
WBC # FLD AUTO: 20.11 K/UL — HIGH (ref 3.8–10.5)

## 2024-09-17 RX ORDER — IBUPROFEN 600 MG
600 TABLET ORAL EVERY 6 HOURS
Refills: 0 | Status: DISCONTINUED | OUTPATIENT
Start: 2024-09-17 | End: 2024-09-20

## 2024-09-17 RX ADMIN — KETOROLAC TROMETHAMINE 30 MILLIGRAM(S): 30 INJECTION, SOLUTION INTRAMUSCULAR at 06:19

## 2024-09-17 RX ADMIN — ACETAMINOPHEN 975 MILLIGRAM(S): 325 TABLET ORAL at 22:00

## 2024-09-17 RX ADMIN — ACETAMINOPHEN 975 MILLIGRAM(S): 325 TABLET ORAL at 20:42

## 2024-09-17 RX ADMIN — KETOROLAC TROMETHAMINE 30 MILLIGRAM(S): 30 INJECTION, SOLUTION INTRAMUSCULAR at 11:31

## 2024-09-17 RX ADMIN — ENOXAPARIN SODIUM 40 MILLIGRAM(S): 100 INJECTION SUBCUTANEOUS at 06:19

## 2024-09-17 RX ADMIN — ACETAMINOPHEN 975 MILLIGRAM(S): 325 TABLET ORAL at 15:31

## 2024-09-17 RX ADMIN — ACETAMINOPHEN 975 MILLIGRAM(S): 325 TABLET ORAL at 10:03

## 2024-09-17 RX ADMIN — Medication 600 MILLIGRAM(S): at 18:03

## 2024-09-17 NOTE — LACTATION INITIAL EVALUATION - NS LACT CON REASON FOR REQ
LC to bedside of Marlo Wen, a 38 yo parent to a 34 weeker infant, admitted to the NICU for management of prematurity. Marlo states that she plans to breastfeed. On exam, breasts are round, dense, symmetric. Nipples intact and everted, +colostrum. LC reviewed hand expression/collection of colostrum with good patient demonstration. We discussed introducing the pump x 15 min Q3h ~24h of life, followed by 10 minutes of hand expression. LC reviewed the keys to an adequate milk supply and the need for breast stimulation Q3h. NICU policy for labeling and transportation of breast milk, as well as cleaning of pump parts was reviewed. Encouraged Marlo to visit her baby and hold her baby skin to skin when both she and the baby and feeling stable. Assured her of ongoing support from lactation team throughout she and her baby's hospitalization. Plan: Begin pumping Q3h x 15 minutes followed by 10 minutes of hand expression, direct breastfeeding once baby is medically cleared. All questions answered at this time and patient verbalizes her understanding. LC available via RN.

## 2024-09-18 RX ORDER — KETOROLAC TROMETHAMINE 30 MG/ML
30 INJECTION, SOLUTION INTRAMUSCULAR ONCE
Refills: 0 | Status: DISCONTINUED | OUTPATIENT
Start: 2024-09-18 | End: 2024-09-18

## 2024-09-18 RX ADMIN — Medication 600 MILLIGRAM(S): at 17:54

## 2024-09-18 RX ADMIN — KETOROLAC TROMETHAMINE 30 MILLIGRAM(S): 30 INJECTION, SOLUTION INTRAMUSCULAR at 06:25

## 2024-09-18 RX ADMIN — ACETAMINOPHEN 975 MILLIGRAM(S): 325 TABLET ORAL at 20:40

## 2024-09-18 RX ADMIN — ACETAMINOPHEN 975 MILLIGRAM(S): 325 TABLET ORAL at 22:00

## 2024-09-18 RX ADMIN — ACETAMINOPHEN 975 MILLIGRAM(S): 325 TABLET ORAL at 09:14

## 2024-09-18 RX ADMIN — ENOXAPARIN SODIUM 40 MILLIGRAM(S): 100 INJECTION SUBCUTANEOUS at 06:25

## 2024-09-18 RX ADMIN — Medication 600 MILLIGRAM(S): at 12:18

## 2024-09-18 RX ADMIN — ACETAMINOPHEN 975 MILLIGRAM(S): 325 TABLET ORAL at 15:00

## 2024-09-18 RX ADMIN — Medication 600 MILLIGRAM(S): at 00:30

## 2024-09-18 RX ADMIN — ACETAMINOPHEN 975 MILLIGRAM(S): 325 TABLET ORAL at 03:31

## 2024-09-18 NOTE — DIETITIAN INITIAL EVALUATION ADULT - PERSON TAUGHT/METHOD
importance of adequate protein and calories, food sources of protein, adequate fluids, continuing with prenatal MVI, importance of adequate iron, food sources of iron/verbal instruction/written material/patient instructed

## 2024-09-18 NOTE — DIETITIAN INITIAL EVALUATION ADULT - OTHER INFO
Patient seen at bedside for length of stay assessment. Patient status post  on  @34w3d. Current diet order: regular. Reports good appetite. Experienced some nausea during first trimester of pregnancy. Infant is in the NICU, mom is hand expressing/pumping. Provided nutrition education discussing importance of adequate protein and calories, food sources of protein, adequate fluids, continuing with prenatal MVI, importance of adequate iron, food sources of iron. Provided handout "Breastfeeding Nutrition Therapy." Patient appreciative, verbalized understanding. Pre-pregnancy weight: 122 pounds. Dosing weight: 150 pounds. No edema documented. Denies nausea/vomiting/diarrhea - small BM today but otherwise no BM since , reports bowel sounds. Nutritionally pertinent labs wnl. Meds: zofran, simethicone.

## 2024-09-18 NOTE — DIETITIAN INITIAL EVALUATION ADULT - PERTINENT MEDS FT
MEDICATIONS  (STANDING):  acetaminophen     Tablet .. 975 milliGRAM(s) Oral <User Schedule>  diphtheria/tetanus/pertussis (acellular) Vaccine (Adacel) 0.5 milliLiter(s) IntraMuscular once  enoxaparin Injectable 40 milliGRAM(s) SubCutaneous every 24 hours  ibuprofen  Tablet. 600 milliGRAM(s) Oral every 6 hours  lactated ringers. 1000 milliLiter(s) (125 mL/Hr) IV Continuous <Continuous>  oxytocin Infusion 167 milliUNIT(s)/Min (167 mL/Hr) IV Continuous <Continuous>    MEDICATIONS  (PRN):  diphenhydrAMINE 25 milliGRAM(s) Oral every 6 hours PRN Pruritus  lanolin Ointment 1 Application(s) Topical every 6 hours PRN Sore Nipples  magnesium hydroxide Suspension 30 milliLiter(s) Oral two times a day PRN Constipation  ondansetron Injectable 8 milliGRAM(s) IV Push every 6 hours PRN Nausea and/or Vomiting  oxyCODONE    IR 5 milliGRAM(s) Oral every 3 hours PRN Moderate to Severe Pain (4-10)  oxyCODONE    IR 5 milliGRAM(s) Oral once PRN Moderate to Severe Pain (4-10)  simethicone 80 milliGRAM(s) Chew every 4 hours PRN Gas

## 2024-09-18 NOTE — DIETITIAN INITIAL EVALUATION ADULT - OTHER CALCULATIONS
Pre-pregnancy weight used to estimate needs (122 pounds). Needs adjusted for breastfeeding, post-op healing.  Estimated energy needs: 8131-4032 kcal (25-30kcal/kg +500)  Estimated protein needs: 71g protein  Estimated fluid needs: 1943-2220mL (35-40mL/kg)

## 2024-09-18 NOTE — DIETITIAN INITIAL EVALUATION ADULT - NUTRITION DIAGNOSIS
Patient: Henrique Cali Date: 10/2/2017   : 6/3/1932 Attending: Sara Soto MD   85 year old male                      Subjective: Feels unsteady with walking, reports history of recent falls     Reviewed: Allergies, Medical History, Surgical History, Social History, Family History and Medications    Vital Last Value 24 Hour Range   Temperature 96.8 °F (36 °C) (10/02/17 1300) Temp  Min: 96.6 °F (35.9 °C)  Max: 97.3 °F (36.3 °C)   Pulse (!) 42 (10/02/17 1530) Pulse  Min: 40  Max: 84   Respiratory 18 (10/02/17 1300) Resp  Min: 16  Max: 18   Non-Invasive  Blood Pressure 151/45 (10/02/17 1530) BP  Min: 108/58  Max: 186/74   Pulse Oximetry 98 % (10/02/17 1300) SpO2  Min: 97 %  Max: 98 %     Vital Today Admit   Weight 74.6 kg (10/01/17 1258) Weight: 74.6 kg (10/01/17 1258)   Height N/A Height: 5' 5\" (165.1 cm) (10/01/17 1258)   BMI N/A BMI (Calculated): 27.43 (10/01/17 1258)     Weight over the past 48 Hours:  Patient Vitals for the past 48 hrs:   Weight   10/01/17 1258 74.6 kg        Intake/Output:    Last Stool Occurrence: 1 (10/02/17 0900)    No intake/output data recorded.    I/O last 3 completed shifts:  In: 540 [P.O.:540]  Out: 580 [Urine:580]      Intake/Output Summary (Last 24 hours) at 10/02/17 1544  Last data filed at 10/02/17 1300   Gross per 24 hour   Intake              540 ml   Output              580 ml   Net              -40 ml       Central Line: No    Guajardo: No    Physical Exam:   Visit Vitals  /45   Pulse (!) 42   Temp 96.8 °F (36 °C) (Oral)   Resp 18   Ht 5' 5\" (1.651 m)   Wt 74.6 kg   SpO2 98%   BMI 27.37 kg/m²     General appearance: alert and appears stated age  Head: Normocephalic, without obvious abnormality, atraumatic  Eyes: conjunctivae/sclerae normal. No erythema, edema or exudate.  Ears: External ears are normal  Nose: Nares normal. Septum midline. Mucosa normal. No drainage or sinus tenderness.  Neck: no adenopathy, no carotid bruit, no JVD, supple, symmetrical, trachea midline and  thyroid not enlarged, symmetric, no tenderness/mass/nodules  Lungs: clear to auscultation bilaterally  Heart: regular rate and rhythm, S1, S2 normal, no murmur, click, rub or gallop  Abdomen: Soft, non-tender; bowel sounds normal; no masses, no organomegaly  Extremities: Mild lower extremity edema  Neurologic: Gait: Abnormal and Early knee recurvatum on the left, marked flexion at the waist  Step to gait pattern    Laboratory Results:    Lab Results   Component Value Date    SODIUM 131 (L) 10/02/2017    POTASSIUM 5.1 10/02/2017    CHLORIDE 97 (L) 10/02/2017    CO2 25 10/02/2017    CALCIUM 7.6 (L) 10/02/2017    BUN 62 (H) 10/02/2017    CREATININE 4.40 (H) 10/02/2017    MG 1.6 (L) 10/02/2017    INR 1.1 10/01/2017    PT 12.6 (H) 10/01/2017    WBC 4.9 10/02/2017    HCT 28.1 (L) 10/02/2017    HGB 9.3 (L) 10/02/2017     10/02/2017    TSH 2.083 02/09/2017    ALBUMIN 2.4 (L) 10/02/2017    GFRA 13 10/02/2017    GFRNA 11 10/02/2017    GLUCOSE 114 (H) 10/02/2017       Imaging: Head CT scan October 1  IMPRESSION:    1.  Unchanged right thalamic intraparenchymal hemorrhage.  2.  No new areas of hemorrhage.  3.  Age-related cerebral atrophy and probable chronic microvascular  ischemic changes         Other: Myocardial perfusion rest/stress, pending    Current Functional status over the last 24 hours:    Nursing Skin Documentation:   Integumentary Assessment: Exceptions to WDL (10/02/17 1040)   Bladder FIM Documentation:            Score: 5-Supervision: Pt requires set up or emptying of urinal (10/02/17 0225)            Bowel FIM Documentation:                        Pain Documentation:   Pain Assessment: Exceptions to WDL (10/02/17 1300)   Mobility Documentation:   , Sit to Supine: Moderate Assist (Mod) (10/02/17 1100)  Sit to Stand: Moderate Assist (Mod) (10/02/17 1100), Stand to Sit: Moderate Assist (Mod) (10/02/17 1100),     ,  ,     ,     Selfcare Documentation:     Grooming Assistance: Touching/Steadying Assistance  (Sara Soto M.D.       yes...

## 2024-09-19 ENCOUNTER — TRANSCRIPTION ENCOUNTER (OUTPATIENT)
Age: 39
End: 2024-09-19

## 2024-09-19 RX ORDER — ACETAMINOPHEN 325 MG/1
3 TABLET ORAL
Qty: 0 | Refills: 0 | DISCHARGE
Start: 2024-09-19

## 2024-09-19 RX ORDER — IBUPROFEN 600 MG
1 TABLET ORAL
Qty: 0 | Refills: 0 | DISCHARGE
Start: 2024-09-19

## 2024-09-19 RX ORDER — TETANUS TOXOID, REDUCED DIPHTHERIA TOXOID AND ACELLULAR PERTUSSIS VACCINE, ADSORBED 5; 2.5; 8; 8; 2.5 [IU]/.5ML; [IU]/.5ML; UG/.5ML; UG/.5ML; UG/.5ML
0.5 SUSPENSION INTRAMUSCULAR ONCE
Refills: 0 | Status: COMPLETED | OUTPATIENT
Start: 2024-09-19 | End: 2024-09-19

## 2024-09-19 RX ADMIN — Medication 600 MILLIGRAM(S): at 00:13

## 2024-09-19 RX ADMIN — ENOXAPARIN SODIUM 40 MILLIGRAM(S): 100 INJECTION SUBCUTANEOUS at 06:00

## 2024-09-19 RX ADMIN — Medication 600 MILLIGRAM(S): at 23:52

## 2024-09-19 RX ADMIN — TETANUS TOXOID, REDUCED DIPHTHERIA TOXOID AND ACELLULAR PERTUSSIS VACCINE, ADSORBED 0.5 MILLILITER(S): 5; 2.5; 8; 8; 2.5 SUSPENSION INTRAMUSCULAR at 22:22

## 2024-09-19 RX ADMIN — Medication 600 MILLIGRAM(S): at 05:54

## 2024-09-19 RX ADMIN — ACETAMINOPHEN 975 MILLIGRAM(S): 325 TABLET ORAL at 20:59

## 2024-09-19 RX ADMIN — ACETAMINOPHEN 975 MILLIGRAM(S): 325 TABLET ORAL at 09:31

## 2024-09-19 RX ADMIN — Medication 600 MILLIGRAM(S): at 13:54

## 2024-09-19 RX ADMIN — Medication 600 MILLIGRAM(S): at 18:15

## 2024-09-19 NOTE — DISCHARGE NOTE OB - PATIENT PORTAL LINK FT
You can access the FollowMyHealth Patient Portal offered by [facility] by registering at the following website: [email or web address]. By joining [facility]’s FollowMyHealth portal, you will also be able to view your health information using other applications (apps) compatible with our system.

## 2024-09-19 NOTE — DISCHARGE NOTE OB - SECONDARY DIAGNOSIS.
delivery Acute postoperative anemia due to expected blood loss  premature rupture of membranes (PPROM) delivered, current hospitalization

## 2024-09-19 NOTE — DISCHARGE NOTE OB - HOSPITAL COURSE
Admitted with PPROM at 33w6d.  Admitted for  steroids and latency abx.  Following the completion of her abx, she was expectantly managed until 34w3d when she began painfully paige.  Delivered via primary  section due to breech presentation.  Uncomplicated surgery and postoperative course.  Acute blood loss anemia noted on post-operative CBC.  Patient stable with normal vital signs.  No intervention necessary.

## 2024-09-19 NOTE — DISCHARGE NOTE OB - CARE PLAN
1 Principal Discharge DX:	 delivery delivered  Assessment and plan of treatment:	 delivery, meeting all postoperative milestones.  Please follow-up with your OB doctor within 1-2 weeks.  You can resume a regular diet at home and may continue your prenatal vitamins as directed.  Please place nothing in the vagina for 6 weeks (no tampons, sex, douching, tub baths, swimming pools, etc).  If you have severe headaches and/or vision changes, heavy bleeding, or chest pain, please call your provider or go to the nearest Emergency Department.  Please call your OB with any signs of symptoms of infection including fever > 100.4 degrees, severe pain, malodorous vaginal discharge or heavy bleeding requiring more than 1-2 pads/hour.  You can take Motrin 600mg orally every 6 hours and Tylenol 1000mg orally every 6 hours for pain as needed.  Secondary Diagnosis:	 premature rupture of membranes (PPROM) delivered, current hospitalization  Secondary Diagnosis:	Acute postoperative anemia due to expected blood loss  Secondary Diagnosis:	 delivery

## 2024-09-19 NOTE — DISCHARGE NOTE OB - AVOID DOUCHING OR TAMPONS UNTIL YOUR POSTPARTUM VISIT
F/U visit with Mr. Ladd. For ongoing wound care needs.   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 Cleansed with vashe, patted dry, and new sacral foam dressing applied.  Right distal stump full thickness ulceration again noted, with slough to wound bed.  Cleansed with vashe. TRIAD paste applied to cover wound, and covered with silicone foam dressing. Recommend change every MWF per HH.    All other review of systems negative, except as noted in HPI Statement Selected

## 2024-09-19 NOTE — DISCHARGE NOTE OB - CARE PROVIDER_API CALL
Lokesh Jean  Obstetrics and Gynecology  [address] [address]  Phone: [phone]  Fax: [phone]  Follow Up Time: 2 weeks

## 2024-09-20 VITALS
HEART RATE: 92 BPM | TEMPERATURE: 98 F | RESPIRATION RATE: 17 BRPM | DIASTOLIC BLOOD PRESSURE: 68 MMHG | SYSTOLIC BLOOD PRESSURE: 109 MMHG | OXYGEN SATURATION: 97 %

## 2024-09-20 PROCEDURE — 82728 ASSAY OF FERRITIN: CPT

## 2024-09-20 PROCEDURE — 84466 ASSAY OF TRANSFERRIN: CPT

## 2024-09-20 PROCEDURE — 86850 RBC ANTIBODY SCREEN: CPT

## 2024-09-20 PROCEDURE — 36415 COLL VENOUS BLD VENIPUNCTURE: CPT

## 2024-09-20 PROCEDURE — 86900 BLOOD TYPING SEROLOGIC ABO: CPT

## 2024-09-20 PROCEDURE — 88307 TISSUE EXAM BY PATHOLOGIST: CPT

## 2024-09-20 PROCEDURE — 87081 CULTURE SCREEN ONLY: CPT

## 2024-09-20 PROCEDURE — 85025 COMPLETE CBC W/AUTO DIFF WBC: CPT

## 2024-09-20 PROCEDURE — 86901 BLOOD TYPING SEROLOGIC RH(D): CPT

## 2024-09-20 PROCEDURE — 83550 IRON BINDING TEST: CPT

## 2024-09-20 PROCEDURE — 90715 TDAP VACCINE 7 YRS/> IM: CPT

## 2024-09-20 PROCEDURE — 83615 LACTATE (LD) (LDH) ENZYME: CPT

## 2024-09-20 PROCEDURE — 59050 FETAL MONITOR W/REPORT: CPT

## 2024-09-20 PROCEDURE — 85384 FIBRINOGEN ACTIVITY: CPT

## 2024-09-20 PROCEDURE — 85730 THROMBOPLASTIN TIME PARTIAL: CPT

## 2024-09-20 PROCEDURE — 84550 ASSAY OF BLOOD/URIC ACID: CPT

## 2024-09-20 PROCEDURE — 80053 COMPREHEN METABOLIC PANEL: CPT

## 2024-09-20 PROCEDURE — 83540 ASSAY OF IRON: CPT

## 2024-09-20 RX ADMIN — ENOXAPARIN SODIUM 40 MILLIGRAM(S): 100 INJECTION SUBCUTANEOUS at 06:43

## 2024-09-20 RX ADMIN — ACETAMINOPHEN 975 MILLIGRAM(S): 325 TABLET ORAL at 09:48

## 2024-09-20 RX ADMIN — Medication 600 MILLIGRAM(S): at 06:43

## 2024-09-20 RX ADMIN — Medication 600 MILLIGRAM(S): at 15:14

## 2024-09-20 NOTE — PROGRESS NOTE ADULT - SUBJECTIVE AND OBJECTIVE BOX
Patient states that she is feeling well this morning. She is resting comfortably but tearful. Patient states that she has been worried about the baby since breaking her water. She is concerned that there are changes in the quality of baby's movements since breaking her water. Prior to going to bed, patient states that she had felt decreased FM. Currently, she is feeling the baby move.   Denies contractions, leakage of fluid, and vaginal bleeding.    T(C): 36.9 (09-14-24 @ 06:00), Max: 36.9 (09-14-24 @ 06:00)  HR: 78 (09-14-24 @ 06:00) (78 - 99)  BP: 94/58 (09-14-24 @ 06:00) (94/58 - 116/77)  RR: 18 (09-14-24 @ 06:00) (16 - 18)  SpO2: 100% (09-14-24 @ 06:00) (95% - 100%)  I&O's Summary    Physical Exam:  General: NAD  Abdomen: soft, gravid, nontender  Extremities: nonedematous    EFM Reviewed:  Baseline 125, mod lorne, + accel, - decel; reactive and reassuring  [place]: 1 ctx/20min    MEDICATIONS  (STANDING):  influenza   Vaccine 0.5 milliLiter(s) IntraMuscular once    MEDICATIONS  (PRN):      LABS:                        9.8    13.95 )-----------( 293      ( 12 Sep 2024 15:47 )             30.7     09-12    133<L>  |  103  |  11  ----------------------------<  118<H>  3.9   |  21<L>  |  0.43<L>    Ca    8.9      12 Sep 2024 15:47    TPro  7.5  /  Alb  3.9  /  TBili  0.3  /  DBili  x   /  AST  25  /  ALT  21  /  AlkPhos  162<H>  09-12    PTT - ( 12 Sep 2024 15:47 )  PTT:26.0 sec  Urinalysis Basic - ( 12 Sep 2024 15:47 )    Color: x / Appearance: x / SG: x / pH: x  Gluc: 118 mg/dL / Ketone: x  / Bili: x / Urobili: x   Blood: x / Protein: x / Nitrite: x   Leuk Esterase: x / RBC: x / WBC x   Sq Epi: x / Non Sq Epi: x / Bacteria: x                
Pt seen and examined at bedside. Pt reports contractions q5-10 minutes that are slightly more painful than period cramps x 3 hours. Pt reports bright red spotting in the toilet this morning just after 7 am.   Endorses frequent fetal movement.   Pt denies fever, chills, chest pain, SOB, nausea, vomiting, lightheadedness, dizziness.      T(F): 98.1 (09-16-24 @ 06:00), Max: 98.6 (09-15-24 @ 10:00)  HR: 76 (09-16-24 @ 06:00) (76 - 92)  BP: 104/67 (09-16-24 @ 06:00) (103/71 - 121/71)  RR: 16 (09-16-24 @ 06:00) (16 - 18)  SpO2: 99% (09-16-24 @ 06:00) (97% - 99%)  Wt(kg): --  I&O's Summary      MEDICATIONS  (STANDING):  influenza   Vaccine 0.5 milliLiter(s) IntraMuscular once  prenatal multivitamin 1 Tablet(s) Oral daily    MEDICATIONS  (PRN):      Physical Exam  Gen: comfortable-appearing, NAD  Pulm: no increased WOB   Fundus: nontender to palpation, abd soft. Contractions palpated. Abdomen firm w/ good relaxation between contractions.   Extremities: SCD in place  SSE: Copious red tinged fluid noted in vaginal vault. Os visually dilated. No fetal parts visualized    FHT:   Cat 1 tracing. Baseline 130bpm. Moderate variability. +accels, no decels noted.   TOCO: Contractions q10 minutes.         LABS:                        8.5    11.14 )-----------( 289      ( 16 Sep 2024 05:30 )             28.0                 
Patient evaluated at bedside this morning, resting comfortable in bed.   She reports pain is well controlled with oral pain medications.   She denies heavy vaginal bleeding.   She has been ambulating without assistance, voiding spontaneously, passing gas, tolerating regular diet.     Physical Exam:  Vital Signs Last 24 Hrs  T(C): 36.4 (19 Sep 2024 06:00), Max: 36.8 (18 Sep 2024 22:45)  T(F): 97.6 (19 Sep 2024 06:00), Max: 98.3 (18 Sep 2024 22:45)  HR: 86 (19 Sep 2024 06:00) (86 - 99)  BP: 108/75 (19 Sep 2024 06:00) (108/75 - 117/72)  BP(mean): --  RR: 17 (19 Sep 2024 06:00) (16 - 17)  SpO2: 98% (19 Sep 2024 06:00) (97% - 98%)    Parameters below as of 18 Sep 2024 22:45  Patient On (Oxygen Delivery Method): room air        GA: well-appearing, NAD  Pulm: no increased WOB  Abd: soft, nontender, no rebound or guarding, incision clean, dry and intact, uterus firm at midline,  fb below umbilicus  Extremities: no calf tenderness                   
Gestational Age: 34w0d  Chief complaint: PPROM    Overnight events:   - rare contractions (not painful)  - s/p betamethasone 9/12 1318  - s/p azithromycin 9/12 1700  - ampicillin 2g q6h (9/12 1600-)  - NPO this AM     Subjective: Patient seen at bedside this AM. She is feeling well. She states the leaking has decreased and now is very scant. She denies painful contractions, vaginal bleeding. Endorses fetal movement.   She denies fever/chills, chest pain, shortness of breath, nausea, vomiting, diarrhea.    Patient and partner with questions regarding delivery timeline, asking if it was preferably to prolong pregnancy beyond 34wks. Reviewed ACOG guidelines for delivery of PPROM at 34wks, however given reassuring fetal status and no signs or symptoms of infection it is reasonable to delay until steroid completion. Discussed how after this point however risks of prolonging pregnancy outweigh benefits to fetus.    T(C): 36.8 (09-13-24 @ 06:19), Max: 36.8 (09-12-24 @ 16:11)  HR: 74 (09-13-24 @ 06:19) (74 - 107)  BP: 96/54 (09-13-24 @ 06:19) (96/54 - 111/76)  RR: 18 (09-13-24 @ 06:19) (6 - 18)  SpO2: 97% (09-13-24 @ 06:19) (95% - 98%)    PAST MEDICAL & SURGICAL HISTORY:  No pertinent past medical history      No significant past surgical history            Physical Exam  Gen: comfortable-appearing, NAD  Pulm: no increased WOB   Extremities: SCD in place    FHT:   Baseline 120, moderate variability, +accels, no decels  TOCO: ctx irregular, q9-10min  reactive & reassuring     MEDICATIONS  (STANDING):  amoxicillin 500 milliGRAM(s) Oral every 8 hours  ampicillin  IVPB      ampicillin  IVPB 2 Gram(s) IV Intermittent every 6 hours  betamethasone Injectable 12 milliGRAM(s) IntraMuscular every 24 hours  influenza   Vaccine 0.5 milliLiter(s) IntraMuscular once  lactated ringers. 1000 milliLiter(s) (125 mL/Hr) IV Continuous <Continuous>      Labs:                        9.8    13.95 )-----------( 293      ( 12 Sep 2024 15:47 )             30.7     09-12    133<L>  |  103  |  11  ----------------------------<  118<H>  3.9   |  21<L>  |  0.43<L>    Ca    8.9      12 Sep 2024 15:47    TPro  7.5  /  Alb  3.9  /  TBili  0.3  /  DBili  x   /  AST  25  /  ALT  21  /  AlkPhos  162<H>  09-12    
Gestational Age: 34w3d  Chief complaint: PPROM    Overnight events: Reported pressure with contractions last night which self-resolved.     Subjective: Patient seen at bedside this AM. She is feeling well. She denies painful contractions or cramping this morning. She denies vaginal bleeding, LOF. Endorses fetal movement. She denies abnormal discharge, fevers or chills.    T(C): 36.7 (09-16-24 @ 06:00), Max: 37 (09-15-24 @ 10:00)  HR: 76 (09-16-24 @ 06:00) (76 - 92)  BP: 104/67 (09-16-24 @ 06:00) (103/71 - 121/71)  RR: 16 (09-16-24 @ 06:00) (16 - 18)  SpO2: 99% (09-16-24 @ 06:00) (97% - 99%)    PAST MEDICAL & SURGICAL HISTORY:  No pertinent past medical history  No significant past surgical history        Physical Exam  Gen: comfortable-appearing, NAD  Pulm: no increased WOB   Fundus: nontender to palpation, abd soft   Extremities: SCD in place    FHT:   baseline 135, moderate variability, +accels, one subtle late deceleration at ~0640  TOCO: irregular ctx 1-2 in 10min  overall reactive and reassuring - pt not feeling contractions but endorses occasional tightening     MEDICATIONS  (STANDING):  influenza   Vaccine 0.5 milliLiter(s) IntraMuscular once  prenatal multivitamin 1 Tablet(s) Oral daily      Labs:                        8.5    11.14 )-----------( 289      ( 16 Sep 2024 05:30 )             28.0           
Patient evaluated at bedside this morning, resting comfortable in bed.   She reports pain is well controlled with oral pain medications.   She denies headache, dizziness, chest pain, palpitations, shortness of breath, nausea, vomiting or heavy vaginal bleeding.  She has been ambulating without assistance, voiding spontaneously, passing gas, tolerating regular diet.     Physical Exam:  Vital Signs Last 24 Hrs  T(C): 36.8 (18 Sep 2024 06:21), Max: 36.9 (17 Sep 2024 13:59)  T(F): 98.2 (18 Sep 2024 06:21), Max: 98.5 (17 Sep 2024 13:59)  HR: 70 (18 Sep 2024 06:21) (70 - 97)  BP: 103/63 (18 Sep 2024 06:21) (103/63 - 121/80)  BP(mean): --  RR: 16 (18 Sep 2024 06:21) (16 - 18)  SpO2: 98% (18 Sep 2024 06:21) (97% - 100%)    Parameters below as of 18 Sep 2024 06:21  Patient On (Oxygen Delivery Method): room air        GA: NAD, A+0 x 3  Pulm: no increased WOB  Abd: soft, nontender, nondistended, no rebound or guarding, incision clean, dry and intact, uterus firm at midline, 2 fb below umbilicus  Extremities: no swelling or calf tenderness                             8.0    20.11 )-----------( 296      ( 17 Sep 2024 05:30 )             26.4               
Patient evaluated at bedside this morning, resting comfortable in bed.   She reports pain is well controlled with oral pain medications.   She denies heavy vaginal bleeding.  She has been ambulating without assistance, voiding spontaneously, passing gas, tolerating regular diet.     Physical Exam:  Vital Signs Last 24 Hrs  T(C): 36.7 (20 Sep 2024 06:44), Max: 37.1 (19 Sep 2024 22:27)  T(F): 98.1 (20 Sep 2024 06:44), Max: 98.8 (19 Sep 2024 22:27)  HR: 84 (20 Sep 2024 06:44) (83 - 88)  BP: 123/83 (20 Sep 2024 06:44) (113/76 - 128/86)  BP(mean): --  RR: 18 (19 Sep 2024 22:27) (18 - 18)  SpO2: 99% (20 Sep 2024 06:44) (95% - 99%)    Parameters below as of 20 Sep 2024 06:44  Patient On (Oxygen Delivery Method): room air        GA: well-appearing, NAD  Pulm: no increased WOB  Abd: soft, nontender, no rebound or guarding, incision clean, dry and intact, uterus firm at midline,  fb below umbilicus  Extremities: no calf tenderness                   
Patient evaluated at bedside this morning, resting comfortably in bed, with no acute events overnight. She currently has no pain.  She denies heavy vaginal bleeding. She denies headache, scotoma, chest pain, shortness of breath, RUQ/epigastric pain.  She has not tried ambulating since procedure, igron remains in place at this time. Tolerating regular diet.     Physical Exam:  Vital Signs Last 24 Hrs  T(C): 36.9 (17 Sep 2024 06:14), Max: 36.9 (16 Sep 2024 14:00)  T(F): 98.4 (17 Sep 2024 06:14), Max: 98.4 (16 Sep 2024 14:00)  HR: 69 (17 Sep 2024 06:14) (66 - 85)  BP: 102/62 (17 Sep 2024 06:14) (95/56 - 118/67)  BP(mean): 87 (16 Sep 2024 18:18) (78 - 87)  RR: 17 (17 Sep 2024 06:14) (16 - 18)  SpO2: 98% (17 Sep 2024 06:14) (95% - 100%)    Parameters below as of 17 Sep 2024 06:14  Patient On (Oxygen Delivery Method): room air        GA: well-appearing, NAD  Pulm: no increased WOB  Abd: soft, nontender, no rebound or guarding, incision clean, dry and intact, uterus firm at midline,  fb below umbilicus  : giron in situ, lochia WNL  Extremities: no calf tenderness, SCDs in place                            8.0    20.11 )-----------( 296      ( 17 Sep 2024 05:30 )             26.4               acetaminophen     Tablet .. 975 milliGRAM(s) Oral <User Schedule>  diphenhydrAMINE 25 milliGRAM(s) Oral every 6 hours PRN  diphtheria/tetanus/pertussis (acellular) Vaccine (Adacel) 0.5 milliLiter(s) IntraMuscular once  enoxaparin Injectable 40 milliGRAM(s) SubCutaneous every 24 hours  ibuprofen  Tablet. 600 milliGRAM(s) Oral every 6 hours  ketorolac   Injectable 30 milliGRAM(s) IV Push every 6 hours  lactated ringers. 1000 milliLiter(s) IV Continuous <Continuous>  lanolin Ointment 1 Application(s) Topical every 6 hours PRN  magnesium hydroxide Suspension 30 milliLiter(s) Oral two times a day PRN  ondansetron Injectable 8 milliGRAM(s) IV Push every 6 hours PRN  oxyCODONE    IR 5 milliGRAM(s) Oral every 3 hours PRN  oxyCODONE    IR 5 milliGRAM(s) Oral once PRN  oxytocin Infusion 167 milliUNIT(s)/Min IV Continuous <Continuous>  simethicone 80 milliGRAM(s) Chew every 4 hours PRN    
Pt feeling well this morning. No complaints - CTX - VB +FM. +intermittent LOF. Denies fevers/chills    T(C): 36.7 (09-15-24 @ 06:00), Max: 37.1 (09-14-24 @ 10:00)  HR: 80 (09-15-24 @ 06:00) (80 - 96)  BP: 103/63 (09-15-24 @ 06:00) (98/58 - 127/74)  RR: 17 (09-15-24 @ 06:00) (16 - 18)  SpO2: 96% (09-15-24 @ 06:00) (96% - 97%)  I&O's Summary    Physical Exam:  General: NAD  Abdomen: soft, gravid, nontender  Extremities: nonedematous    EFM Reviewed:  Baseline 145, mod lorne, + accel, - decel; reactive and reassuring  [place]: no ctx    MEDICATIONS  (STANDING):  influenza   Vaccine 0.5 milliLiter(s) IntraMuscular once  prenatal multivitamin 1 Tablet(s) Oral daily  sodium ferric gluconate complex IVPB 125 milliGRAM(s) IV Intermittent <User Schedule>    MEDICATIONS  (PRN):      LABS:                        8.3    11.73 )-----------( 290      ( 14 Sep 2024 05:30 )             26.8

## 2024-09-20 NOTE — PROGRESS NOTE ADULT - ASSESSMENT
39y Female POD#1 s/p primary  section for breech at 34wks after p/w PPROM                                     - Neuro/Pain:  toradol atc, tylenol atc, oxy prn  - CV:  VS per routine, AM CBC with Hb 8.0 acute blood loss anemia, asymptomatic   - Pulm: Encourage ISS & ambulation  - GI: regular diet  - : Michelle in place, to be removed this morning, TOV this afternoon  - DVT ppx: SCDs, Lovenox 40mg QD  - Dispo: POD #2/3
A/P: 39y s/p  section, POD#2, stable  -  Pain: PO motrin q6hrs, tylenol q8hrs, oxycodone for severe pain PRN  -  Post-operatively labs: post-op Hgb stable , hemodynamically stable, no symptoms of anemia   -  GI: tolerating regular diet, passing gas  -  : s/p giron , urinating without difficulty  -  DVT prophylaxis: encouraged increased ambulation, SCDs, SQL  -  Dispo: POD 3 or 4
Assessment & Plan:   38yo  at 34w3d p/w vaginal bleeding x1d found to have PPROM  (unknown date of ROM). On admission she was 1/L and denied painful contractions. She is s/p BMZx2 -. She was initially started on latency antibiotics which were discontinued at 34wk0d. Fetus is breech, planning for delivery via primary  section. Given no signs or symptoms of infection plan is for expectant management for now.     #PPROM   [ ] AM CBC  wnl, W 11.1, H 8.5  [x] s/p partial latency abx -     [x] Steroid complete  1318   [ ] NST TID   MFM consult : Reasonable to consider delivery at 36wk       #Breech   [x] Consented for pCS     #Anemia   [ ] Ferrlicetx3 day -     #PNC   [x] PNV | SCDs | PN labs   [x] GBS  negative   [ ] Next T&S   
38yo  at 34w2d reporting VB x1d found to be PPROM (unknown date of ROM as she was reporting brown discharge x1wk). +FM. Denies ctx.   #PPROM   [ ] AM CBC 9/15 w/ iron studies and T+S   [x] s/p partial latency abx -     [x] Steroid complete  1318   [ ] NST TID   MFM consult : Reasonable to consider delivery at 36wk     #Anemia - ordered for ferrlicet    #Breech   [x] Consented for pCS     #PNC   [x] PNV | SCDs | PN labs   [ ] F/u GBS  (received)   [ ] Next T&S 9/15 (ordered) 
A&P: 38yo  at 34w0d reporting vaginal bleeding x1d found to have PPROM  (unknown date of ROM as she was reporting brown discharge x1wk). On admission she was 1/L and denied painful contractions. She received BM  1318 and was started on latency antibiotics. Fetus is breech, planning for delivery via  after steroid complete.    #PPROM   [] Latency abx: Amp x8 doses ordered ( 1600-), order amox after complete   [] Steroids – next dose  1318   [] Continuous FHT, toco   [] NICU consult  AM     #Breech   [x] Consented for pCS   [ ] NPO  AM    [ ] Deliver if contractions     #PNC   [x] PNV | SCDs    [] F/u GBS  (received) 
A/P: 39y s/p primary  section at 34wks for breech after p/w PROM, POD#4, stable  -  Pain: PO motrin q6hrs, tylenol q8hrs, oxycodone for severe pain PRN  -  Post-operatively labs: post-op Hgb 8.0, hemodynamically stable, acute blood loss anemia, asymptomatic; s/p IV iron as antepartum patient   -  GI: tolerating regular diet, passing gas  -  : s/p giron , urinating without difficulty  -  DVT prophylaxis: encouraged increased ambulation, SCDs, SQL  -  Dispo: POD 4
38yo  at 34w3d p/w vaginal bleeding x1d found to have PPROM  (unknown date of ROM). On admission she was 1/L and denied painful contractions. She is s/p BMZx2 -. She was initially started on latency antibiotics which were discontinued at 34wk0d. Fetus is breech, planning for delivery via primary  section. Patient endorsing painful contractions since 7 am.   - Plan to monitor contractions. Consider delivery via c section if contractions continue. 
40yo  at 34w1d reporting VB x1d found to be PPROM (unknown date of ROM as she was reporting brown discharge x1wk). +FM. Denies ctx.     #PPROM   [ ] AM CBC  (ordered)   [x] s/p partial latency abx -     [x] Steroid complete  1318   [ ] NST TID    [ ] BPP  for decreased FM  MFM consult : delivery  PM vs expectant management        #Breech   [x] Consented for pCS     #PNC   [x] PNV | SCDs | PN labs   [ ] F/u GBS  (received)   [ ] Next T&S 9/15 (ordered)   [ ] [place] Q 
A/P: 39y s/p primary  section for breech presentation at 34wks after p/w PPROM, POD#3, stable  -  Pain: PO motrin q6hrs, tylenol q8hrs, oxycodone for severe pain PRN  -  Post-operatively labs: post-op Hgb 8.0, hemodynamically stable, acute blood loss anemia, asymptomatic. S/P IV iron as antepartum  -  GI: tolerating regular diet, passing gas  -  : s/p giron , urinating without difficulty  -  DVT prophylaxis: encouraged increased ambulation, SCDs, SQL  -  Dispo: POD 3 or 4

## 2024-09-23 ENCOUNTER — APPOINTMENT (OUTPATIENT)
Dept: ANTEPARTUM | Facility: CLINIC | Age: 39
End: 2024-09-23

## 2024-09-24 LAB — SURGICAL PATHOLOGY STUDY: SIGNIFICANT CHANGE UP

## 2024-09-27 DIAGNOSIS — O34.13 MATERNAL CARE FOR BENIGN TUMOR OF CORPUS UTERI, THIRD TRIMESTER: ICD-10-CM

## 2024-09-27 DIAGNOSIS — D25.9 LEIOMYOMA OF UTERUS, UNSPECIFIED: ICD-10-CM

## 2024-09-27 DIAGNOSIS — O46.93 ANTEPARTUM HEMORRHAGE, UNSPECIFIED, THIRD TRIMESTER: ICD-10-CM

## 2024-09-27 DIAGNOSIS — Z86.19 PERSONAL HISTORY OF OTHER INFECTIOUS AND PARASITIC DISEASES: ICD-10-CM

## 2024-09-27 DIAGNOSIS — Z3A.33 33 WEEKS GESTATION OF PREGNANCY: ICD-10-CM

## 2024-09-27 DIAGNOSIS — Z28.09 IMMUNIZATION NOT CARRIED OUT BECAUSE OF OTHER CONTRAINDICATION: ICD-10-CM

## 2024-09-27 DIAGNOSIS — D62 ACUTE POSTHEMORRHAGIC ANEMIA: ICD-10-CM

## 2024-09-27 DIAGNOSIS — O36.8930 MATERNAL CARE FOR OTHER SPECIFIED FETAL PROBLEMS, THIRD TRIMESTER, NOT APPLICABLE OR UNSPECIFIED: ICD-10-CM

## 2024-09-27 DIAGNOSIS — Z23 ENCOUNTER FOR IMMUNIZATION: ICD-10-CM

## 2024-10-30 ENCOUNTER — TRANSCRIPTION ENCOUNTER (OUTPATIENT)
Age: 39
End: 2024-10-30

## 2024-11-19 ENCOUNTER — APPOINTMENT (OUTPATIENT)
Dept: INTERNAL MEDICINE | Facility: CLINIC | Age: 39
End: 2024-11-19
Payer: COMMERCIAL

## 2024-11-19 VITALS
HEART RATE: 80 BPM | DIASTOLIC BLOOD PRESSURE: 83 MMHG | WEIGHT: 125 LBS | HEIGHT: 65 IN | BODY MASS INDEX: 20.83 KG/M2 | SYSTOLIC BLOOD PRESSURE: 123 MMHG

## 2024-11-19 DIAGNOSIS — Z78.9 OTHER SPECIFIED HEALTH STATUS: ICD-10-CM

## 2024-11-19 DIAGNOSIS — Z00.00 ENCOUNTER FOR GENERAL ADULT MEDICAL EXAMINATION W/OUT ABNORMAL FINDINGS: ICD-10-CM

## 2024-11-19 DIAGNOSIS — Z82.49 FAMILY HISTORY OF ISCHEMIC HEART DISEASE AND OTHER DISEASES OF THE CIRCULATORY SYSTEM: ICD-10-CM

## 2024-11-19 DIAGNOSIS — Z83.438 FAMILY HISTORY OF OTHER DISORDER OF LIPOPROTEIN METABOLISM AND OTHER LIPIDEMIA: ICD-10-CM

## 2024-11-19 DIAGNOSIS — D25.9 LEIOMYOMA OF UTERUS, UNSPECIFIED: ICD-10-CM

## 2024-11-19 DIAGNOSIS — Z13.220 ENCOUNTER FOR SCREENING FOR LIPOID DISORDERS: ICD-10-CM

## 2024-11-19 DIAGNOSIS — R87.619 UNSPECIFIED ABNORMAL CYTOLOGICAL FINDINGS IN SPECIMENS FROM CERVIX UTERI: ICD-10-CM

## 2024-11-19 DIAGNOSIS — Z13.1 ENCOUNTER FOR SCREENING FOR DIABETES MELLITUS: ICD-10-CM

## 2024-11-19 PROCEDURE — 99386 PREV VISIT NEW AGE 40-64: CPT

## 2024-11-19 PROCEDURE — 36415 COLL VENOUS BLD VENIPUNCTURE: CPT

## 2024-11-20 ENCOUNTER — TRANSCRIPTION ENCOUNTER (OUTPATIENT)
Age: 39
End: 2024-11-20

## 2024-11-20 LAB
ALBUMIN SERPL ELPH-MCNC: 4.4 G/DL
ALP BLD-CCNC: 62 U/L
ALT SERPL-CCNC: 9 U/L
ANION GAP SERPL CALC-SCNC: 14 MMOL/L
AST SERPL-CCNC: 19 U/L
BASOPHILS # BLD AUTO: 0.03 K/UL
BASOPHILS NFR BLD AUTO: 0.7 %
BILIRUB SERPL-MCNC: 0.6 MG/DL
BUN SERPL-MCNC: 13 MG/DL
CALCIUM SERPL-MCNC: 9.3 MG/DL
CHLORIDE SERPL-SCNC: 105 MMOL/L
CHOLEST SERPL-MCNC: 212 MG/DL
CO2 SERPL-SCNC: 22 MMOL/L
CREAT SERPL-MCNC: 0.56 MG/DL
EGFR: 118 ML/MIN/1.73M2
EOSINOPHIL # BLD AUTO: 0.06 K/UL
EOSINOPHIL NFR BLD AUTO: 1.4 %
ESTIMATED AVERAGE GLUCOSE: 108 MG/DL
GLUCOSE SERPL-MCNC: 77 MG/DL
HBA1C MFR BLD HPLC: 5.4 %
HCT VFR BLD CALC: 39.6 %
HDLC SERPL-MCNC: 73 MG/DL
HGB BLD-MCNC: 12.1 G/DL
IMM GRANULOCYTES NFR BLD AUTO: 0.2 %
LDLC SERPL CALC-MCNC: 131 MG/DL
LYMPHOCYTES # BLD AUTO: 1.85 K/UL
LYMPHOCYTES NFR BLD AUTO: 44 %
MAN DIFF?: NORMAL
MCHC RBC-ENTMCNC: 26.8 PG
MCHC RBC-ENTMCNC: 30.6 G/DL
MCV RBC AUTO: 87.6 FL
MONOCYTES # BLD AUTO: 0.34 K/UL
MONOCYTES NFR BLD AUTO: 8.1 %
NEUTROPHILS # BLD AUTO: 1.91 K/UL
NEUTROPHILS NFR BLD AUTO: 45.6 %
NONHDLC SERPL-MCNC: 139 MG/DL
PLATELET # BLD AUTO: 256 K/UL
POTASSIUM SERPL-SCNC: 3.9 MMOL/L
PROT SERPL-MCNC: 7.1 G/DL
RBC # BLD: 4.52 M/UL
RBC # FLD: 15.2 %
SODIUM SERPL-SCNC: 141 MMOL/L
TRIGL SERPL-MCNC: 44 MG/DL
WBC # FLD AUTO: 4.2 K/UL